# Patient Record
Sex: FEMALE | Race: WHITE | NOT HISPANIC OR LATINO
[De-identification: names, ages, dates, MRNs, and addresses within clinical notes are randomized per-mention and may not be internally consistent; named-entity substitution may affect disease eponyms.]

---

## 2017-06-12 ENCOUNTER — RX ONLY (OUTPATIENT)
Age: 22
Setting detail: RX ONLY
End: 2017-06-12

## 2017-06-12 RX ORDER — MINOCYCLINE HYDROCHLORIDE 100 MG/1
CAPSULE ORAL
Qty: 30 | Refills: 4 | Status: ERX

## 2017-07-26 ENCOUNTER — APPOINTMENT (RX ONLY)
Dept: URBAN - METROPOLITAN AREA CLINIC 170 | Facility: CLINIC | Age: 22
Setting detail: DERMATOLOGY
End: 2017-07-26

## 2017-07-26 DIAGNOSIS — L70.0 ACNE VULGARIS: ICD-10-CM

## 2017-07-26 PROCEDURE — ? COUNSELING

## 2017-07-26 PROCEDURE — 99201: CPT

## 2017-07-26 PROCEDURE — ? TREATMENT REGIMEN

## 2017-07-26 PROCEDURE — ? PRESCRIPTION

## 2017-07-26 RX ORDER — TRETINOIN 0.5 MG/G
CREAM TOPICAL
Qty: 1 | Refills: 11 | Status: ERX | COMMUNITY
Start: 2017-07-26

## 2017-07-26 RX ORDER — CLINDAMYCIN PHOSPHATE 1 %
GEL (GRAM) TOPICAL
Qty: 1 | Refills: 11 | Status: ERX | COMMUNITY
Start: 2017-07-26

## 2017-07-26 RX ORDER — MINOCYCLINE HYDROCHLORIDE 100 MG/1
CAPSULE ORAL DAILY
Qty: 60 | Refills: 11 | Status: ERX | COMMUNITY
Start: 2017-07-26

## 2017-07-26 RX ORDER — CLINDAMYCIN PHOSPHATE 10 MG/ML
LOTION TOPICAL
Qty: 1 | Refills: 5 | Status: ERX | COMMUNITY
Start: 2017-07-26

## 2017-07-26 RX ADMIN — Medication: at 19:33

## 2017-07-26 RX ADMIN — TRETINOIN: 0.5 CREAM TOPICAL at 19:33

## 2017-07-26 RX ADMIN — CLINDAMYCIN PHOSPHATE: 10 LOTION TOPICAL at 19:32

## 2017-07-26 RX ADMIN — MINOCYCLINE HYDROCHLORIDE: 100 CAPSULE ORAL at 19:31

## 2017-07-26 ASSESSMENT — LOCATION SIMPLE DESCRIPTION DERM: LOCATION SIMPLE: LEFT CHEEK

## 2017-07-26 ASSESSMENT — LOCATION ZONE DERM: LOCATION ZONE: FACE

## 2017-07-26 ASSESSMENT — LOCATION DETAILED DESCRIPTION DERM: LOCATION DETAILED: LEFT CENTRAL MALAR CHEEK

## 2017-07-26 NOTE — PROCEDURE: TREATMENT REGIMEN
Initiate Treatment: Clindamycin lotion to face  every morning\\nTretinoin 0.05 cream to face every night at bedtime
Detail Level: Simple
Continue Regimen: Minocycline 100mg by mouth twice a day for one month and as acne clears start one pill by mouth once a day.

## 2017-07-27 RX ORDER — CLINDAMYCIN PHOSPHATE 10 MG/ML
LOTION TOPICAL
Qty: 1 | Refills: 5 | Status: ERX

## 2017-07-27 RX ORDER — TRETINOIN 0.5 MG/G
CREAM TOPICAL
Qty: 1 | Refills: 11 | Status: ERX

## 2017-07-27 RX ORDER — MINOCYCLINE HYDROCHLORIDE 100 MG/1
CAPSULE ORAL DAILY
Qty: 60 | Refills: 11 | Status: ERX

## 2017-07-27 RX ORDER — CLINDAMYCIN PHOSPHATE 1 %
GEL (GRAM) TOPICAL
Qty: 1 | Refills: 11 | Status: ERX